# Patient Record
Sex: MALE | Race: WHITE | URBAN - METROPOLITAN AREA
[De-identification: names, ages, dates, MRNs, and addresses within clinical notes are randomized per-mention and may not be internally consistent; named-entity substitution may affect disease eponyms.]

---

## 2017-08-23 ENCOUNTER — OFFICE VISIT (OUTPATIENT)
Dept: OTOLARYNGOLOGY | Facility: CLINIC | Age: 62
End: 2017-08-23

## 2017-08-23 VITALS
WEIGHT: 235 LBS | HEIGHT: 71 IN | SYSTOLIC BLOOD PRESSURE: 139 MMHG | TEMPERATURE: 97 F | BODY MASS INDEX: 32.9 KG/M2 | DIASTOLIC BLOOD PRESSURE: 90 MMHG

## 2017-08-23 DIAGNOSIS — H61.23 BILATERAL IMPACTED CERUMEN: ICD-10-CM

## 2017-08-23 DIAGNOSIS — H60.322 ACUTE HEMORRHAGIC OTITIS EXTERNA OF LEFT EAR: Primary | ICD-10-CM

## 2017-08-23 PROCEDURE — 99243 OFF/OP CNSLTJ NEW/EST LOW 30: CPT | Performed by: OTOLARYNGOLOGY

## 2017-08-23 PROCEDURE — 69210 REMOVE IMPACTED EAR WAX UNI: CPT | Performed by: OTOLARYNGOLOGY

## 2017-08-23 PROCEDURE — 99212 OFFICE O/P EST SF 10 MIN: CPT | Performed by: OTOLARYNGOLOGY

## 2017-08-23 RX ORDER — NEOMYCIN SULFATE, POLYMYXIN B SULFATE AND HYDROCORTISONE 10; 3.5; 1 MG/ML; MG/ML; [USP'U]/ML
SUSPENSION/ DROPS AURICULAR (OTIC)
Refills: 0 | COMMUNITY
Start: 2017-08-14

## 2017-08-23 RX ORDER — AMOXICILLIN 500 MG/1
CAPSULE ORAL
Refills: 0 | COMMUNITY
Start: 2017-08-14

## 2017-08-23 NOTE — PROGRESS NOTES
Waylon Osborn is a 64year old male.  Patient presents with:  Ear Problem: left ear pain with bloody drainage for 1.5 week and pt had 1 course of antibiotic      HISTORY OF PRESENT ILLNESS  8/23/2017  Patient  presents with bloody ear drainage from the  lef bleeding and easy bruising.      PHYSICAL EXAM    /90   Temp (!) 97 °F (36.1 °C) (Tympanic)   Ht 5' 11\" (1.803 m)   Wt 235 lb (106.6 kg)   BMI 32.78 kg/m²     System Findings Details   Skin Normal Inspection - Normal. No suspicious lesions bruises or

## (undated) NOTE — LETTER
[facility]  [facility] [address]  [address] Jose       08/23/17        Patient: Donya Milian   YOB: 1955   Date of Visit: 8/23/2017       Dear  Dr. Ana Prado MD,      Thank you for referring Donya Milian to my practice.     Remove